# Patient Record
Sex: FEMALE | Race: WHITE | NOT HISPANIC OR LATINO | Employment: UNEMPLOYED | URBAN - METROPOLITAN AREA
[De-identification: names, ages, dates, MRNs, and addresses within clinical notes are randomized per-mention and may not be internally consistent; named-entity substitution may affect disease eponyms.]

---

## 2022-05-07 ENCOUNTER — HOSPITAL ENCOUNTER (EMERGENCY)
Facility: HOSPITAL | Age: 47
Discharge: HOME | End: 2022-05-07
Attending: INTERNAL MEDICINE

## 2022-05-07 VITALS
OXYGEN SATURATION: 98 % | BODY MASS INDEX: 28.17 KG/M2 | HEART RATE: 90 BPM | SYSTOLIC BLOOD PRESSURE: 154 MMHG | TEMPERATURE: 98 F | WEIGHT: 165 LBS | HEIGHT: 64 IN | RESPIRATION RATE: 18 BRPM | DIASTOLIC BLOOD PRESSURE: 100 MMHG

## 2022-05-07 DIAGNOSIS — S53.104A: Primary | ICD-10-CM

## 2022-05-07 DIAGNOSIS — M25.521 RIGHT ELBOW PAIN: ICD-10-CM

## 2022-05-07 PROCEDURE — 99284 EMERGENCY DEPT VISIT MOD MDM: CPT

## 2022-05-07 PROCEDURE — 73080 X-RAY EXAM OF ELBOW: CPT | Mod: 26,RT,, | Performed by: RADIOLOGY

## 2022-05-07 PROCEDURE — 73080 XR ELBOW COMPLETE 3 VIEW RIGHT: ICD-10-PCS | Mod: 26,RT,, | Performed by: RADIOLOGY

## 2022-05-07 PROCEDURE — 73080 X-RAY EXAM OF ELBOW: CPT | Mod: TC,FY,RT

## 2022-05-07 PROCEDURE — 25000003 PHARM REV CODE 250: Performed by: NURSE PRACTITIONER

## 2022-05-07 RX ORDER — LISINOPRIL 20 MG/1
20 TABLET ORAL DAILY
COMMUNITY

## 2022-05-07 RX ORDER — NAPROXEN 500 MG/1
500 TABLET ORAL 2 TIMES DAILY WITH MEALS
Qty: 20 TABLET | Refills: 0 | Status: SHIPPED | OUTPATIENT
Start: 2022-05-07 | End: 2022-05-17

## 2022-05-07 RX ORDER — HYDROCODONE BITARTRATE AND ACETAMINOPHEN 10; 325 MG/1; MG/1
1 TABLET ORAL
Status: COMPLETED | OUTPATIENT
Start: 2022-05-07 | End: 2022-05-07

## 2022-05-07 RX ORDER — FLUOXETINE HYDROCHLORIDE 40 MG/1
40 CAPSULE ORAL DAILY
COMMUNITY

## 2022-05-07 RX ORDER — CYCLOBENZAPRINE HCL 10 MG
10 TABLET ORAL 3 TIMES DAILY PRN
Qty: 15 TABLET | Refills: 0 | Status: SHIPPED | OUTPATIENT
Start: 2022-05-07 | End: 2022-05-12

## 2022-05-07 RX ORDER — PHENYTOIN SODIUM 300 MG/1
300 CAPSULE, EXTENDED RELEASE ORAL DAILY
COMMUNITY

## 2022-05-07 RX ADMIN — HYDROCODONE BITARTRATE AND ACETAMINOPHEN 1 TABLET: 10; 325 TABLET ORAL at 12:05

## 2022-05-07 NOTE — ED PROVIDER NOTES
Encounter Date: 5/7/2022       History     Chief Complaint   Patient presents with    Arm Injury     Right arm injury 3 months ago not getting better     HPI  Review of patient's allergies indicates:   Allergen Reactions    Penicillins Hives     Past Medical History:   Diagnosis Date    Depression     Hypertension     Seizures      History reviewed. No pertinent surgical history.  History reviewed. No pertinent family history.  Social History     Tobacco Use    Smoking status: Current Every Day Smoker     Types: Cigarettes    Smokeless tobacco: Never Used   Substance Use Topics    Alcohol use: Not Currently    Drug use: Not Currently     Review of Systems    Physical Exam     Initial Vitals   BP Pulse Resp Temp SpO2   05/07/22 1105 05/07/22 1104 05/07/22 1104 05/07/22 1104 05/07/22 1104   (!) 154/103 95 18 98.4 °F (36.9 °C) 98 %      MAP       --                Physical Exam    ED Course   Procedures  Labs Reviewed - No data to display       Imaging Results          X-Ray Elbow Complete Right (Final result)  Result time 05/07/22 12:04:52    Final result by Jacqueline Malcolm MD (05/07/22 12:04:52)                 Impression:      Dislocated elbow.  Multiple ossifications about the elbow suggesting heterotopic ossification although some could represent small fracture fragments particular from the distal humerus.      Electronically signed by: Jacqueline Malcolm MD  Date:    05/07/2022  Time:    12:04             Narrative:    EXAMINATION:  XR ELBOW COMPLETE 3 VIEW RIGHT    CLINICAL HISTORY:  . Pain in right elbow    TECHNIQUE:  AP, lateral, and oblique views of the right elbow were performed.    COMPARISON:  None    FINDINGS:  Dislocation at the elbow.  The distal humerus is dislocated anterior relative to the radius and.  Multiple ossifications about the elbow suggesting heterotopic ossification in this patient with history of trauma 3 months ago.  Small fracture fragment from distal humerus could also be  present.                                 Medications   HYDROcodone-acetaminophen  mg per tablet 1 tablet (has no administration in time range)     Medical Decision Making:   ED Management:  Advised patient and  that the x-ray is now showing prospect ossification which is early calcification and scarring already taken place in that joint.  At that point she needs to see orthopedics will send referral to our referral center.             ED Course as of 05/07/22 1235   Sat May 07, 2022   1233 The patient states that she fell her arm possibly between 4 and 6 weeks ago.  She patient is very vague on the history.  She states initially she went to [facility] and they reduced the elbow and made referral to Orthopedics for which she did not go.  Two 3 weeks later she went to [facility] where they x-rayed her and referred to Orthopedics for which she has not made that appointment yet.  Patient is here for pain medication. [PW]      ED Course User Index  [PW] Omid Chahal MD             Clinical Impression:   Final diagnoses:  [M25.521] Right elbow pain  [M25.521] Right elbow pain - right elbow dislocation  [S53.104A] Dislocated elbow, right, initial encounter (Primary)          ED Disposition Condition    Discharge Stable        ED Prescriptions     None        Follow-up Information    None          Omid Chahal MD  05/08/22 7145

## 2022-05-07 NOTE — ED NOTES
Pt stated she has multiple slings at home that she will apply when she gets home and did not want the sling ordered today. Pt educated on the importance of wearing the sling until she receives further instruction from her orthopedic Dr.

## 2022-05-07 NOTE — ED PROVIDER NOTES
Encounter Date: 5/7/2022       History     Chief Complaint   Patient presents with    Arm Injury     Right arm injury 3 months ago not getting better     46-year-old patient came to the ER for right elbow dislocation a month ago, it was recurrent dislocation, she had reduction on her right elbow twice but she did not have  orthopedic follow-up since the last dislocation occurred    The history is provided by the patient.   Arm Injury   The incident occurred several weeks ago (3 months ago). The injury mechanism was a fall. The context of the injury is unknown. There is an injury to the right elbow.     Review of patient's allergies indicates:   Allergen Reactions    Penicillins Hives     Past Medical History:   Diagnosis Date    Depression     Hypertension     Seizures      History reviewed. No pertinent surgical history.  History reviewed. No pertinent family history.  Social History     Tobacco Use    Smoking status: Current Every Day Smoker     Types: Cigarettes    Smokeless tobacco: Never Used   Substance Use Topics    Alcohol use: Not Currently    Drug use: Not Currently     Review of Systems   Musculoskeletal: Positive for arthralgias and joint swelling.   All other systems reviewed and are negative.      Physical Exam     Initial Vitals   BP Pulse Resp Temp SpO2   05/07/22 1105 05/07/22 1104 05/07/22 1104 05/07/22 1104 05/07/22 1104   (!) 154/103 95 18 98.4 °F (36.9 °C) 98 %      MAP       --                Physical Exam    Nursing note and vitals reviewed.  Constitutional: She appears well-developed and well-nourished. No distress.   HENT:   Head: Normocephalic and atraumatic.   Eyes: EOM are normal. Pupils are equal, round, and reactive to light.   Neck:   Normal range of motion.  Cardiovascular: Normal rate and regular rhythm.   No murmur heard.  Pulmonary/Chest: Breath sounds normal. No respiratory distress. She has no wheezes. She has no rhonchi. She has no rales. She exhibits no tenderness.    Abdominal: Abdomen is soft.   Musculoskeletal:      Right elbow: Swelling and deformity present. Decreased range of motion. Tenderness present.      Cervical back: Normal range of motion.      Comments: Right Radial pulses 2+, neurovascularly intact     Neurological: She is alert and oriented to person, place, and time. She has normal strength. GCS score is 15. GCS eye subscore is 4. GCS verbal subscore is 5. GCS motor subscore is 6.   Skin: Skin is warm and dry.   Psychiatric: She has a normal mood and affect.         ED Course   Procedures  Labs Reviewed - No data to display       Imaging Results          X-Ray Elbow Complete Right (Final result)  Result time 05/07/22 12:04:52    Final result by Jacqueline Malcolm MD (05/07/22 12:04:52)                 Impression:      Dislocated elbow.  Multiple ossifications about the elbow suggesting heterotopic ossification although some could represent small fracture fragments particular from the distal humerus.      Electronically signed by: Jacqueline Malcolm MD  Date:    05/07/2022  Time:    12:04             Narrative:    EXAMINATION:  XR ELBOW COMPLETE 3 VIEW RIGHT    CLINICAL HISTORY:  . Pain in right elbow    TECHNIQUE:  AP, lateral, and oblique views of the right elbow were performed.    COMPARISON:  None    FINDINGS:  Dislocation at the elbow.  The distal humerus is dislocated anterior relative to the radius and.  Multiple ossifications about the elbow suggesting heterotopic ossification in this patient with history of trauma 3 months ago.  Small fracture fragment from distal humerus could also be present.                                 Medications   HYDROcodone-acetaminophen  mg per tablet 1 tablet (1 tablet Oral Given 5/7/22 1240)     Medical Decision Making:   Differential Diagnosis:   Dislocated right elbow  ED Management:  DR. Chahal examined patient in person.  Referral to the Ortho  Patient was advised to see orthopedic as soon as possible  Please return  for new, changing, or worsening pain. The patient was also instructed that follow up with a primary care physician is strongly recommended after any visit to the ED.  Patient expressed understanding and agreed with treatment plan and was discharged in stable condition.                ED Course as of 05/08/22 0122   Sat May 07, 2022   1233 The patient states that she fell her arm possibly between 4 and 6 weeks ago.  She patient is very vague on the history.  She states initially she went to [facility] and they reduced the elbow and made referral to Orthopedics for which she did not go.  Two 3 weeks later she went to [facility] where they x-rayed her and referred to Orthopedics for which she has not made that appointment yet.  Patient is here for pain medication. [PW]      ED Course User Index  [PW] Omid Chahal MD             Clinical Impression:   Final diagnoses:  [M25.521] Right elbow pain  [M25.521] Right elbow pain - right elbow dislocation  [S53.104A] Dislocated elbow, right, initial encounter (Primary)          ED Disposition Condition    Discharge Stable        ED Prescriptions     Medication Sig Dispense Start Date End Date Auth. Provider    cyclobenzaprine (FLEXERIL) 10 MG tablet Take 1 tablet (10 mg total) by mouth 3 (three) times daily as needed for Muscle spasms. 15 tablet 5/7/2022 5/12/2022 Azalea Guzman NP    naproxen (NAPROSYN) 500 MG tablet Take 1 tablet (500 mg total) by mouth 2 (two) times daily with meals. for 10 days 20 tablet 5/7/2022 5/17/2022 Azalea Guzman NP        Follow-up Information     Follow up With Specialties Details Why Contact Info    Dr. Salazar (Orthopedic)  In 2 days  [address], [place] [address] [phone]    [facility] Emergency Dept Emergency Medicine  If symptoms worsen [address] [facility] [address] [phone]           Azalea Guzman NP  05/08/22 0124

## 2022-05-07 NOTE — ED NOTES
Pt stated she has dislocated her right elbow 2 times and recently got her cast removed that was placed after the last elbow reduction and believes her elbow is dislocated again. Pt c/o increased swelling and pain in the right elbow.

## 2022-07-21 ENCOUNTER — DOCUMENTATION ONLY (OUTPATIENT)
Dept: ORTHOPEDICS | Facility: CLINIC | Age: 47
End: 2022-07-21